# Patient Record
Sex: MALE | Race: WHITE | Employment: UNEMPLOYED | URBAN - METROPOLITAN AREA
[De-identification: names, ages, dates, MRNs, and addresses within clinical notes are randomized per-mention and may not be internally consistent; named-entity substitution may affect disease eponyms.]

---

## 2018-10-05 ENCOUNTER — APPOINTMENT (OUTPATIENT)
Dept: GENERAL RADIOLOGY | Age: 38
End: 2018-10-05
Payer: COMMERCIAL

## 2018-10-05 ENCOUNTER — HOSPITAL ENCOUNTER (EMERGENCY)
Age: 38
Discharge: HOME | End: 2018-10-05
Attending: EMERGENCY MEDICINE
Payer: COMMERCIAL

## 2018-10-05 VITALS
HEART RATE: 73 BPM | DIASTOLIC BLOOD PRESSURE: 79 MMHG | OXYGEN SATURATION: 93 % | BODY MASS INDEX: 38.41 KG/M2 | SYSTOLIC BLOOD PRESSURE: 127 MMHG | TEMPERATURE: 98.1 F | WEIGHT: 268.3 LBS | HEIGHT: 70 IN | RESPIRATION RATE: 20 BRPM

## 2018-10-05 DIAGNOSIS — R03.0 ELEVATED BP WITHOUT DIAGNOSIS OF HYPERTENSION: ICD-10-CM

## 2018-10-05 DIAGNOSIS — M54.5 BILATERAL LOW BACK PAIN, UNSPECIFIED CHRONICITY, WITH SCIATICA PRESENCE UNSPECIFIED: Primary | ICD-10-CM

## 2018-10-05 LAB
BILIRUBIN URINE: NEGATIVE
BLOOD, URINE: NEGATIVE
CLARITY: CLEAR
COLOR: YELLOW
GLUCOSE URINE: NEGATIVE MG/DL
KETONES, URINE: ABNORMAL MG/DL
LEUKOCYTE ESTERASE, URINE: NEGATIVE
MICROSCOPIC EXAMINATION: ABNORMAL
NITRITE, URINE: NEGATIVE
PH UA: 6.5
PROTEIN UA: NEGATIVE MG/DL
SPECIFIC GRAVITY UA: 1.02
URINE REFLEX TO CULTURE: ABNORMAL
URINE TYPE: ABNORMAL
UROBILINOGEN, URINE: 1 E.U./DL

## 2018-10-05 PROCEDURE — 96372 THER/PROPH/DIAG INJ SC/IM: CPT

## 2018-10-05 PROCEDURE — 72100 X-RAY EXAM L-S SPINE 2/3 VWS: CPT

## 2018-10-05 PROCEDURE — 99283 EMERGENCY DEPT VISIT LOW MDM: CPT

## 2018-10-05 PROCEDURE — 6370000000 HC RX 637 (ALT 250 FOR IP): Performed by: PHYSICIAN ASSISTANT

## 2018-10-05 PROCEDURE — 81003 URINALYSIS AUTO W/O SCOPE: CPT

## 2018-10-05 PROCEDURE — 6360000002 HC RX W HCPCS: Performed by: PHYSICIAN ASSISTANT

## 2018-10-05 RX ORDER — KETOROLAC TROMETHAMINE 30 MG/ML
60 INJECTION, SOLUTION INTRAMUSCULAR; INTRAVENOUS ONCE
Status: COMPLETED | OUTPATIENT
Start: 2018-10-05 | End: 2018-10-05

## 2018-10-05 RX ORDER — ORPHENADRINE CITRATE 30 MG/ML
60 INJECTION INTRAMUSCULAR; INTRAVENOUS ONCE
Status: DISCONTINUED | OUTPATIENT
Start: 2018-10-05 | End: 2018-10-05

## 2018-10-05 RX ORDER — METHOCARBAMOL 500 MG/1
500 TABLET, FILM COATED ORAL ONCE
Status: COMPLETED | OUTPATIENT
Start: 2018-10-05 | End: 2018-10-05

## 2018-10-05 RX ORDER — METHYLPREDNISOLONE 4 MG/1
TABLET ORAL
Qty: 1 KIT | Refills: 0 | Status: SHIPPED | OUTPATIENT
Start: 2018-10-05 | End: 2018-10-11

## 2018-10-05 RX ADMIN — METHOCARBAMOL 500 MG: 500 TABLET, FILM COATED ORAL at 19:54

## 2018-10-05 RX ADMIN — KETOROLAC TROMETHAMINE 60 MG: 30 INJECTION, SOLUTION INTRAMUSCULAR at 19:02

## 2018-10-05 RX ADMIN — HYDROMORPHONE HYDROCHLORIDE 1 MG: 1 INJECTION, SOLUTION INTRAMUSCULAR; INTRAVENOUS; SUBCUTANEOUS at 19:05

## 2018-10-05 ASSESSMENT — PAIN DESCRIPTION - PAIN TYPE
TYPE: ACUTE PAIN
TYPE: ACUTE PAIN

## 2018-10-05 ASSESSMENT — ENCOUNTER SYMPTOMS
SHORTNESS OF BREATH: 0
ABDOMINAL PAIN: 0
COLOR CHANGE: 0
BACK PAIN: 1

## 2018-10-05 ASSESSMENT — PAIN SCALES - GENERAL
PAINLEVEL_OUTOF10: 10
PAINLEVEL_OUTOF10: 7
PAINLEVEL_OUTOF10: 10

## 2018-10-05 ASSESSMENT — PAIN DESCRIPTION - DESCRIPTORS
DESCRIPTORS: ACHING
DESCRIPTORS: ACHING

## 2018-10-05 ASSESSMENT — PAIN DESCRIPTION - LOCATION
LOCATION: BACK
LOCATION: BACK

## 2018-10-05 NOTE — ED PROVIDER NOTES
relaxers at home. Also discharged with a Medrol Dosepak. Follow-up with primary care and return for any new, worsening or other concerns. I estimate there is LOW risk for ABDOMINAL AORTIC ANEURYSM, KIDNEY STONE, PYELONEPHRITIS, CAUDA EQUINA SYNDROME, EPIDURAL MASS LESION, OR CORD COMPRESSION, thus I consider the discharge disposition reasonable. We discussed returning to the Emergency Department immediately if new or worsening symptoms occur. Discussed the symptoms which are most concerning (e.g., saddle anesthesia, urinary or bowel incontinence or retention, changing or worsening pain) that necessitate immediate return. This patient was seen by the attending physician and they agreed with the assessment and plan. CONSULTS:  None    PROCEDURES:  None    FINAL IMPRESSION      1. Bilateral low back pain, unspecified chronicity, with sciatica presence unspecified    2. Elevated BP without diagnosis of hypertension          DISPOSITION/PLAN   DISPOSITION Decision To Discharge 10/05/2018 07:47:45 PM      PATIENT REFERRED TO:  Isaac Hollins MD  [address] [place] [id number]    Call   For follow up      DISCHARGE MEDICATIONS:  New Prescriptions    METHYLPREDNISOLONE (MEDROL, CHARLETTE,) 4 MG TABLET    Take by mouth.        (Please note that portions of this note were completed with a voice recognition program.  Efforts were made to edit the dictations but occasionally words are mis-transcribed.)    Yahir May, [address], [address]  10/05/18 0877

## 2018-10-05 NOTE — ED TRIAGE NOTES
Pt to ED with c/o of back pain x 3.5 weeks. Pt states pain shooting into bilateral legs but primarily on the left. Pt states pain radiating into scrotum since this AM. States he has seen other docters about disc dieses. States he has been having difficulty walking.

## 2022-10-02 ENCOUNTER — HOSPITAL ENCOUNTER (EMERGENCY)
Age: 42
Discharge: HOME | End: 2022-10-02
Payer: COMMERCIAL

## 2022-10-02 VITALS
RESPIRATION RATE: 18 BRPM | BODY MASS INDEX: 35.76 KG/M2 | SYSTOLIC BLOOD PRESSURE: 133 MMHG | TEMPERATURE: 98.1 F | OXYGEN SATURATION: 96 % | WEIGHT: 249.78 LBS | DIASTOLIC BLOOD PRESSURE: 102 MMHG | HEIGHT: 70 IN | HEART RATE: 62 BPM

## 2022-10-02 DIAGNOSIS — M25.512 ACUTE PAIN OF LEFT SHOULDER: Primary | ICD-10-CM

## 2022-10-02 PROCEDURE — 99283 EMERGENCY DEPT VISIT LOW MDM: CPT

## 2022-10-02 PROCEDURE — 6370000000 HC RX 637 (ALT 250 FOR IP): Performed by: NURSE PRACTITIONER

## 2022-10-02 RX ORDER — METHOCARBAMOL 500 MG/1
1500 TABLET, FILM COATED ORAL ONCE
Status: COMPLETED | OUTPATIENT
Start: 2022-10-02 | End: 2022-10-02

## 2022-10-02 RX ORDER — METHOCARBAMOL 500 MG/1
500 TABLET, FILM COATED ORAL 4 TIMES DAILY
Qty: 40 TABLET | Refills: 0 | Status: SHIPPED | OUTPATIENT
Start: 2022-10-02 | End: 2022-10-12

## 2022-10-02 RX ORDER — LIDOCAINE 50 MG/G
1 PATCH TOPICAL DAILY
Qty: 10 PATCH | Refills: 0 | Status: SHIPPED | OUTPATIENT
Start: 2022-10-02 | End: 2022-10-12

## 2022-10-02 RX ORDER — LIDOCAINE 4 G/G
1 PATCH TOPICAL DAILY
Status: DISCONTINUED | OUTPATIENT
Start: 2022-10-02 | End: 2022-10-02 | Stop reason: HOSPADM

## 2022-10-02 RX ORDER — PREDNISONE 20 MG/1
TABLET ORAL
Qty: 18 TABLET | Refills: 0 | Status: SHIPPED | OUTPATIENT
Start: 2022-10-02 | End: 2022-10-12

## 2022-10-02 RX ORDER — PREDNISONE 20 MG/1
60 TABLET ORAL ONCE
Status: COMPLETED | OUTPATIENT
Start: 2022-10-02 | End: 2022-10-02

## 2022-10-02 RX ADMIN — METHOCARBAMOL TABLETS 1500 MG: 500 TABLET, COATED ORAL at 12:56

## 2022-10-02 RX ADMIN — PREDNISONE 60 MG: 20 TABLET ORAL at 12:57

## 2022-10-02 ASSESSMENT — PAIN SCALES - GENERAL
PAINLEVEL_OUTOF10: 8
PAINLEVEL_OUTOF10: 8

## 2022-10-02 ASSESSMENT — PAIN DESCRIPTION - ORIENTATION: ORIENTATION: LEFT

## 2022-10-02 ASSESSMENT — PAIN DESCRIPTION - FREQUENCY: FREQUENCY: CONTINUOUS

## 2022-10-02 ASSESSMENT — PAIN DESCRIPTION - LOCATION: LOCATION: SHOULDER

## 2022-10-02 ASSESSMENT — PAIN - FUNCTIONAL ASSESSMENT: PAIN_FUNCTIONAL_ASSESSMENT: 0-10

## 2022-10-02 ASSESSMENT — PAIN DESCRIPTION - PAIN TYPE: TYPE: ACUTE PAIN

## 2022-10-02 NOTE — DISCHARGE INSTRUCTIONS
Do not take NSAIDs including but not limited to ibuprofen, Motrin, Advil, Aleve, naproxen, etc. while taking prednisone    Never place a heating pad over lidocaine patch as this can cause burns to your skin

## 2022-10-02 NOTE — ED TRIAGE NOTES
Pt arrives ambulatory for eval of left shoulder pain, denies injury. Pt sts he cannot rotate his shoulder. Pt is a/ox4,rsp nonlabored and pwd.

## 2022-10-06 NOTE — ED PROVIDER NOTES
[address]  [address] [place] [address]  Dept: [phone]  Loc: [address] ENCOUNTER        This patient was not seen or evaluated by the attending physician. I evaluated this patient, the attending physician was available for consultation. CHIEF COMPLAINT    Chief Complaint   Patient presents with    Shoulder Pain     Pt arrives ambulatory for eval of left shoulder pain, denies injury       HPI    Malu Valles is a 39 y.o. male who presents to the emergency department with left shoulder pain. Patient states that he was at the [employer] game on Thursday and jumped up to chair the players raising his arms above his head. He states since then he has had pain in the back of the shoulder. He denies injury. He denies falls. He denies paresthesias or numbness. He is right-hand dominant. REVIEW OF SYSTEMS    Musculoskeletal: see HPI, no other joint or bony injury  Cardiac: No chest pain  Pulmonary: No difficulty breathing  Skin: No lacerations or puncture wounds  Neurologic: No loss of consciousness, no head injury, no paresthesias or focal distal extremity weakness    PAST MEDICAL & SURGICAL HISTORY    Past Medical History:   Diagnosis Date    Gastric ulcer      Past Surgical History:   Procedure Laterality Date    APPENDECTOMY      TONSILLECTOMY AND ADENOIDECTOMY         CURRENT MEDICATIONS  (may include discharge medications prescribed in the ED)  Current Outpatient Rx   Medication Sig Dispense Refill    lidocaine (LIDODERM) 5 % Place 1 patch onto the skin daily for 10 days 12 hours on, 12 hours off.  10 patch 0    predniSONE (DELTASONE) 20 MG tablet 3 tabs po qam for 3 days then 2 tabs qam for 3 days the 1 tab qam for 3 days 18 tablet 0    methocarbamol (ROBAXIN) 500 MG tablet Take 1 tablet by mouth 4 times daily for 10 days 40 tablet 0       ALLERGIES    No Known Allergies    SOCIAL & FAMILY HISTORY    Social History     Socioeconomic History    Marital status: Single     Spouse name: None    Number of children: None    Years of education: None    Highest education level: None   Tobacco Use    Smoking status: Never    Smokeless tobacco: Never   Vaping Use    Vaping Use: Never used   Substance and Sexual Activity    Alcohol use: Yes     Comment: occ    Drug use: No     History reviewed. No pertinent family history. PHYSICAL EXAM    VITAL SIGNS: BP (!) 133/102   Pulse 62   Temp 98.1 °F (36.7 °C) (Oral)   Resp 18   Ht 5' 10\" (1.778 m)   Wt 249 lb 12.5 oz (113.3 kg)   SpO2 96%   BMI 35.84 kg/m²   Constitutional:  Well nourished, no acute distress  HENT:  Atraumatic, moist mucous membranes  NECK: normal range of motion/supple, no posterior midline neck tenderness  Respiratory: Lungs clear to auscultation bilaterally, no retractions  Cardiovascular:  Regular rate, no JVD  Vascular:  radial pulse 2+ on the affected side  Musculoskeletal:  Exam of the affected shoulder reveals mild tenderness to palpation to posterior shoulder, no obvious deformity, passive and active range of motion intact. No increased warmth or joint effusion    Integument:  Well hydrated, no skin lacerations   Neurologic:  Awake, alert, no slurred speech, left axillary nerve is intact and the median/ulnar/radial nerve sensory and motor distributions are intact on the affected side    RADIOLOGY   No orders to display       ED [facility]   See chart for medications given during emergency department course. Medications   predniSONE (DELTASONE) tablet 60 mg (60 mg Oral Given 10/2/22 1257)   methocarbamol (ROBAXIN) tablet 1,500 mg (1,500 mg Oral Given 10/2/22 1256)     I have seen and evaluated this patient.   My attending physician was available for consultation    Differential diagnosis: includes but not limited to rotator cuff sprain/tear/rupture, clavicle fracture, humerus fracture, scapular fracture, posterior or anterior glenohumeral joint dislocation, glenohumeral subluxation, AC joint separation, brachioplexus injury or other peripheral nerve injury, cervical spine neurologic or mechanical bony injury, left apical pneumothorax, arterial injury/Ischemia, Infection, other    Patient is complaining of left posterior shoulder pain after jumping up during a football game. He is neurologically intact. I encouraged the patient to have an x-ray but the patient declined stating he did not injure his shoulder. We will go ahead and start him on symptomatic medications and he can follow-up with orthopedics for possible rotator cuff tear. The patient was instructed to follow up as an outpatient in 3 days with orthopedics. The patient was instructed to return to the ED immediately for any new or worsening symptoms. The patient verbalized understanding. FINAL IMPRESSION    1.  Acute pain of left shoulder         PLAN  Discharge with outpatient follow-up (see EMR)    (Please note that this note was completed with a voice recognition program.  Every attempt was made to edit the dictations, but inevitably there remain words that are mis-transcribed.)        Anaid Marcelo, SABRINA - GRIFFIN  10/05/22 5560